# Patient Record
Sex: FEMALE | Race: BLACK OR AFRICAN AMERICAN | NOT HISPANIC OR LATINO | URBAN - METROPOLITAN AREA
[De-identification: names, ages, dates, MRNs, and addresses within clinical notes are randomized per-mention and may not be internally consistent; named-entity substitution may affect disease eponyms.]

---

## 2018-05-14 ENCOUNTER — OUTPATIENT (OUTPATIENT)
Dept: OUTPATIENT SERVICES | Facility: HOSPITAL | Age: 53
LOS: 1 days | End: 2018-05-14
Payer: COMMERCIAL

## 2018-05-14 VITALS
WEIGHT: 177.03 LBS | SYSTOLIC BLOOD PRESSURE: 108 MMHG | DIASTOLIC BLOOD PRESSURE: 69 MMHG | HEART RATE: 76 BPM | HEIGHT: 68 IN | OXYGEN SATURATION: 95 % | TEMPERATURE: 98 F | RESPIRATION RATE: 16 BRPM

## 2018-05-14 DIAGNOSIS — M17.12 UNILATERAL PRIMARY OSTEOARTHRITIS, LEFT KNEE: ICD-10-CM

## 2018-05-14 DIAGNOSIS — S83.242A OTHER TEAR OF MEDIAL MENISCUS, CURRENT INJURY, LEFT KNEE, INITIAL ENCOUNTER: ICD-10-CM

## 2018-05-14 DIAGNOSIS — Z01.818 ENCOUNTER FOR OTHER PREPROCEDURAL EXAMINATION: ICD-10-CM

## 2018-05-14 DIAGNOSIS — Q21.1 ATRIAL SEPTAL DEFECT: Chronic | ICD-10-CM

## 2018-05-14 DIAGNOSIS — M25.562 PAIN IN LEFT KNEE: ICD-10-CM

## 2018-05-14 DIAGNOSIS — Z98.51 TUBAL LIGATION STATUS: Chronic | ICD-10-CM

## 2018-05-14 LAB
ANION GAP SERPL CALC-SCNC: 7 MMOL/L — SIGNIFICANT CHANGE UP (ref 5–17)
BUN SERPL-MCNC: 13 MG/DL — SIGNIFICANT CHANGE UP (ref 7–23)
CALCIUM SERPL-MCNC: 8.6 MG/DL — SIGNIFICANT CHANGE UP (ref 8.4–10.5)
CHLORIDE SERPL-SCNC: 105 MMOL/L — SIGNIFICANT CHANGE UP (ref 96–108)
CO2 SERPL-SCNC: 28 MMOL/L — SIGNIFICANT CHANGE UP (ref 22–31)
CREAT SERPL-MCNC: 0.71 MG/DL — SIGNIFICANT CHANGE UP (ref 0.5–1.3)
GLUCOSE SERPL-MCNC: 84 MG/DL — SIGNIFICANT CHANGE UP (ref 70–99)
HCT VFR BLD CALC: 35.9 % — SIGNIFICANT CHANGE UP (ref 34.5–45)
HGB BLD-MCNC: 11.8 G/DL — SIGNIFICANT CHANGE UP (ref 11.5–15.5)
MCHC RBC-ENTMCNC: 29.7 PG — SIGNIFICANT CHANGE UP (ref 27–34)
MCHC RBC-ENTMCNC: 32.9 GM/DL — SIGNIFICANT CHANGE UP (ref 32–36)
MCV RBC AUTO: 90.1 FL — SIGNIFICANT CHANGE UP (ref 80–100)
PLATELET # BLD AUTO: 166 K/UL — SIGNIFICANT CHANGE UP (ref 150–400)
POTASSIUM SERPL-MCNC: 3.7 MMOL/L — SIGNIFICANT CHANGE UP (ref 3.5–5.3)
POTASSIUM SERPL-SCNC: 3.7 MMOL/L — SIGNIFICANT CHANGE UP (ref 3.5–5.3)
RBC # BLD: 3.98 M/UL — SIGNIFICANT CHANGE UP (ref 3.8–5.2)
RBC # FLD: 13 % — SIGNIFICANT CHANGE UP (ref 10.3–14.5)
SODIUM SERPL-SCNC: 140 MMOL/L — SIGNIFICANT CHANGE UP (ref 135–145)
WBC # BLD: 2.7 K/UL — LOW (ref 3.8–10.5)
WBC # FLD AUTO: 2.7 K/UL — LOW (ref 3.8–10.5)

## 2018-05-14 PROCEDURE — 80048 BASIC METABOLIC PNL TOTAL CA: CPT

## 2018-05-14 PROCEDURE — 85027 COMPLETE CBC AUTOMATED: CPT

## 2018-05-14 PROCEDURE — G0463: CPT

## 2018-05-14 PROCEDURE — 93005 ELECTROCARDIOGRAM TRACING: CPT

## 2018-05-14 PROCEDURE — 93010 ELECTROCARDIOGRAM REPORT: CPT | Mod: NC

## 2018-05-14 NOTE — H&P PST ADULT - PROBLEM SELECTOR PLAN 1
"Operative arthroscopy left knee" on 5/29/18  Pre op instructions were reviewed and signed  patient will obtain medical clearance  diagnostic testing was performed

## 2018-05-14 NOTE — H&P PST ADULT - NSANTHOSAYNRD_GEN_A_CORE
No. LOUISE screening performed.  STOP BANG Legend: 0-2 = LOW Risk; 3-4 = INTERMEDIATE Risk; 5-8 = HIGH Risk

## 2018-05-14 NOTE — H&P PST ADULT - HISTORY OF PRESENT ILLNESS
51 yo female is scheduled for "Operative arthroscopy left knee" on 5/29/18 with Igor Brown MD.  Patient with left knee pain since 1/2018 for which she has tried physical therapy.  Pain worst with weight bearing with swelling and rates 8/10.

## 2018-05-14 NOTE — H&P PST ADULT - NEGATIVE ENMT SYMPTOMS
no throat pain/no dysphagia/no nasal congestion/no nasal obstruction/no abnormal taste sensation/no ear pain/no hearing difficulty/no recurrent cold sores/no tinnitus/no gum bleeding/no vertigo/no sinus symptoms/no nose bleeds/no dry mouth/no nasal discharge/no post-nasal discharge

## 2018-05-17 RX ORDER — CHLORHEXIDINE GLUCONATE 213 G/1000ML
1 SOLUTION TOPICAL ONCE
Qty: 0 | Refills: 0 | Status: COMPLETED | OUTPATIENT
Start: 2018-05-29 | End: 2018-05-29

## 2018-05-17 NOTE — ASU DISCHARGE PLAN (ADULT/PEDIATRIC). - DRESSING FT
Remove bandages in 48 hours and apply band-aids to incision sites. Remove bandages in 48 hours, cleanse wounds with peroxide and apply band-aids over sutures.

## 2018-05-17 NOTE — ASU DISCHARGE PLAN (ADULT/PEDIATRIC). - ACTIVITY LEVEL
no tub baths/elevate extremity/no exercise/Use cane/crutches for ambulation. elevate extremity/weight bearing as tolerated/no tub baths/exercise/as per brochure

## 2018-05-17 NOTE — ASU DISCHARGE PLAN (ADULT/PEDIATRIC). - FOLLOWUP APPOINTMENT CLINIC/PHYSICIAN
Please call Dr. NIKKI Brown's office to schedule a follow-up appointment to be seen in 7-10 days. Please call Dr. NIKKI Brown's office to schedule a follow-up appointment to be seen in 7-10 days.  #

## 2018-05-17 NOTE — ASU DISCHARGE PLAN (ADULT/PEDIATRIC). - NOTIFY
Swelling that continues/Numbness, color, or temperature change to extremity/Bleeding that does not stop/Pain not relieved by Medications/Increased Irritability or Sluggishness/Fever greater than 101

## 2018-05-17 NOTE — ASU DISCHARGE PLAN (ADULT/PEDIATRIC). - MEDICATION SUMMARY - MEDICATIONS TO TAKE
I will START or STAY ON the medications listed below when I get home from the hospital:    Percocet 5/325 oral tablet  -- 1 tab(s) by mouth every 6 hours, As Needed -for moderate pain MDD:4   -- Caution federal law prohibits the transfer of this drug to any person other  than the person for whom it was prescribed.  May cause drowsiness.  Alcohol may intensify this effect.  Use care when operating dangerous machinery.  This prescription cannot be refilled.  This product contains acetaminophen.  Do not use  with any other product containing acetaminophen to prevent possible liver damage.  Using more of this medication than prescribed may cause serious breathing problems.    -- Indication: For moderate to severe left knee pain

## 2018-05-17 NOTE — ASU DISCHARGE PLAN (ADULT/PEDIATRIC). - SPECIAL INSTRUCTIONS
FOLLOW enclosed knee arthroscopy brochure.    May take over-the-counter pain relievers (Aleve (naprosyn), Motrin(ibuprofen) as directed for mild to moderate knee pain.

## 2018-05-17 NOTE — ASU DISCHARGE PLAN (ADULT/PEDIATRIC). - INSTRUCTIONS
REST! Apply ice packs to incision sites 20 mins on, 20 mins off every 4 hours for first 24 hours.  If taking narcotic pain medications, may take COLACE (OTC stool softener) as directed to prevent constipation. REST! Apply ice packs to incision sites 20 minutes on, 20 minutes off every 4 hours for first 24 hours.  If taking narcotic pain medications, may take COLACE (OTC stool softener) as directed to prevent constipation. Regular

## 2018-05-28 ENCOUNTER — TRANSCRIPTION ENCOUNTER (OUTPATIENT)
Age: 53
End: 2018-05-28

## 2018-05-29 ENCOUNTER — RESULT REVIEW (OUTPATIENT)
Age: 53
End: 2018-05-29

## 2018-05-29 ENCOUNTER — OUTPATIENT (OUTPATIENT)
Dept: OUTPATIENT SERVICES | Facility: HOSPITAL | Age: 53
LOS: 1 days | End: 2018-05-29
Payer: COMMERCIAL

## 2018-05-29 VITALS
DIASTOLIC BLOOD PRESSURE: 60 MMHG | SYSTOLIC BLOOD PRESSURE: 113 MMHG | RESPIRATION RATE: 16 BRPM | HEART RATE: 60 BPM | OXYGEN SATURATION: 100 %

## 2018-05-29 VITALS
SYSTOLIC BLOOD PRESSURE: 113 MMHG | TEMPERATURE: 98 F | OXYGEN SATURATION: 100 % | WEIGHT: 178.57 LBS | DIASTOLIC BLOOD PRESSURE: 64 MMHG | HEART RATE: 57 BPM | HEIGHT: 67.5 IN | RESPIRATION RATE: 8 BRPM

## 2018-05-29 DIAGNOSIS — M17.12 UNILATERAL PRIMARY OSTEOARTHRITIS, LEFT KNEE: ICD-10-CM

## 2018-05-29 DIAGNOSIS — Z01.818 ENCOUNTER FOR OTHER PREPROCEDURAL EXAMINATION: ICD-10-CM

## 2018-05-29 DIAGNOSIS — Z98.51 TUBAL LIGATION STATUS: Chronic | ICD-10-CM

## 2018-05-29 DIAGNOSIS — S83.242A OTHER TEAR OF MEDIAL MENISCUS, CURRENT INJURY, LEFT KNEE, INITIAL ENCOUNTER: ICD-10-CM

## 2018-05-29 DIAGNOSIS — Q21.1 ATRIAL SEPTAL DEFECT: Chronic | ICD-10-CM

## 2018-05-29 LAB — HCG UR QL: NEGATIVE — SIGNIFICANT CHANGE UP

## 2018-05-29 PROCEDURE — G8980: CPT | Mod: CI

## 2018-05-29 PROCEDURE — 88304 TISSUE EXAM BY PATHOLOGIST: CPT | Mod: 26

## 2018-05-29 PROCEDURE — G8978: CPT | Mod: CI

## 2018-05-29 PROCEDURE — 81025 URINE PREGNANCY TEST: CPT

## 2018-05-29 PROCEDURE — 97161 PT EVAL LOW COMPLEX 20 MIN: CPT | Mod: CI

## 2018-05-29 PROCEDURE — 29880 ARTHRS KNE SRG MNISECTMY M&L: CPT | Mod: LT

## 2018-05-29 PROCEDURE — G8979: CPT | Mod: CI

## 2018-05-29 PROCEDURE — 88304 TISSUE EXAM BY PATHOLOGIST: CPT

## 2018-05-29 RX ORDER — OXYCODONE AND ACETAMINOPHEN 5; 325 MG/1; MG/1
1 TABLET ORAL EVERY 4 HOURS
Qty: 0 | Refills: 0 | Status: DISCONTINUED | OUTPATIENT
Start: 2018-05-29 | End: 2018-05-29

## 2018-05-29 RX ORDER — SODIUM CHLORIDE 9 MG/ML
1000 INJECTION, SOLUTION INTRAVENOUS
Qty: 0 | Refills: 0 | Status: DISCONTINUED | OUTPATIENT
Start: 2018-05-29 | End: 2018-06-13

## 2018-05-29 RX ORDER — ONDANSETRON 8 MG/1
4 TABLET, FILM COATED ORAL ONCE
Qty: 0 | Refills: 0 | Status: DISCONTINUED | OUTPATIENT
Start: 2018-05-29 | End: 2018-06-13

## 2018-05-29 RX ORDER — FENTANYL CITRATE 50 UG/ML
25 INJECTION INTRAVENOUS
Qty: 0 | Refills: 0 | Status: DISCONTINUED | OUTPATIENT
Start: 2018-05-29 | End: 2018-05-29

## 2018-05-29 RX ORDER — CEFAZOLIN SODIUM 1 G
2000 VIAL (EA) INJECTION ONCE
Qty: 0 | Refills: 0 | Status: COMPLETED | OUTPATIENT
Start: 2018-05-29 | End: 2018-05-29

## 2018-05-29 RX ADMIN — SODIUM CHLORIDE 50 MILLILITER(S): 9 INJECTION, SOLUTION INTRAVENOUS at 09:03

## 2018-05-29 RX ADMIN — CHLORHEXIDINE GLUCONATE 1 APPLICATION(S): 213 SOLUTION TOPICAL at 06:55

## 2018-05-29 NOTE — BRIEF OPERATIVE NOTE - PROCEDURE
<<-----Click on this checkbox to enter Procedure Arthroscopy of knee  05/29/2018  Left knee  Partial lateral menisectomy  Excision Berkley Ayers

## 2018-05-30 LAB — SURGICAL PATHOLOGY FINAL REPORT - CH: SIGNIFICANT CHANGE UP

## 2018-07-19 ENCOUNTER — APPOINTMENT (OUTPATIENT)
Dept: ORTHOPEDIC SURGERY | Facility: CLINIC | Age: 53
End: 2018-07-19
Payer: COMMERCIAL

## 2018-07-19 VITALS
SYSTOLIC BLOOD PRESSURE: 122 MMHG | HEART RATE: 88 BPM | HEIGHT: 67 IN | DIASTOLIC BLOOD PRESSURE: 75 MMHG | WEIGHT: 178 LBS | BODY MASS INDEX: 27.94 KG/M2

## 2018-07-19 PROCEDURE — 99024 POSTOP FOLLOW-UP VISIT: CPT

## 2018-08-29 PROBLEM — M25.562 PAIN IN LEFT KNEE: Chronic | Status: ACTIVE | Noted: 2018-05-14

## 2018-09-13 ENCOUNTER — APPOINTMENT (OUTPATIENT)
Dept: ORTHOPEDIC SURGERY | Facility: CLINIC | Age: 53
End: 2018-09-13
Payer: COMMERCIAL

## 2018-09-13 VITALS — WEIGHT: 178 LBS | BODY MASS INDEX: 34.95 KG/M2 | HEIGHT: 60 IN

## 2018-09-13 DIAGNOSIS — M47.816 SPONDYLOSIS W/OUT MYELOPATHY OR RADICULOPATHY, LUMBAR REGION: ICD-10-CM

## 2018-09-13 PROCEDURE — 99214 OFFICE O/P EST MOD 30 MIN: CPT

## 2018-10-08 ENCOUNTER — APPOINTMENT (OUTPATIENT)
Dept: ORTHOPEDIC SURGERY | Facility: CLINIC | Age: 53
End: 2018-10-08

## 2018-10-10 ENCOUNTER — APPOINTMENT (OUTPATIENT)
Dept: ORTHOPEDIC SURGERY | Facility: CLINIC | Age: 53
End: 2018-10-10
Payer: COMMERCIAL

## 2018-10-10 VITALS — BODY MASS INDEX: 34.95 KG/M2 | HEIGHT: 60 IN | WEIGHT: 178 LBS

## 2018-10-10 DIAGNOSIS — M23.201 DERANGEMENT OF UNSPECIFIED LATERAL MENISCUS DUE TO OLD TEAR OR INJURY, LEFT KNEE: ICD-10-CM

## 2018-10-10 PROCEDURE — 99214 OFFICE O/P EST MOD 30 MIN: CPT

## 2019-01-21 ENCOUNTER — APPOINTMENT (OUTPATIENT)
Dept: ORTHOPEDIC SURGERY | Facility: CLINIC | Age: 54
End: 2019-01-21
Payer: COMMERCIAL

## 2019-01-21 VITALS — BODY MASS INDEX: 25.48 KG/M2 | WEIGHT: 178 LBS | HEIGHT: 70 IN

## 2019-01-21 DIAGNOSIS — M70.61 TROCHANTERIC BURSITIS, RIGHT HIP: ICD-10-CM

## 2019-01-21 DIAGNOSIS — M17.10 UNILATERAL PRIMARY OSTEOARTHRITIS, UNSPECIFIED KNEE: ICD-10-CM

## 2019-01-21 PROCEDURE — 99213 OFFICE O/P EST LOW 20 MIN: CPT

## 2020-10-02 ENCOUNTER — OUTPATIENT (OUTPATIENT)
Dept: OUTPATIENT SERVICES | Facility: HOSPITAL | Age: 55
LOS: 1 days | End: 2020-10-02
Payer: COMMERCIAL

## 2020-10-02 DIAGNOSIS — Q21.1 ATRIAL SEPTAL DEFECT: Chronic | ICD-10-CM

## 2020-10-02 DIAGNOSIS — Z98.51 TUBAL LIGATION STATUS: Chronic | ICD-10-CM

## 2020-10-02 DIAGNOSIS — Z11.59 ENCOUNTER FOR SCREENING FOR OTHER VIRAL DISEASES: ICD-10-CM

## 2020-10-02 LAB — SARS-COV-2 RNA SPEC QL NAA+PROBE: SIGNIFICANT CHANGE UP

## 2020-10-02 PROCEDURE — U0003: CPT

## 2020-11-11 ENCOUNTER — OUTPATIENT (OUTPATIENT)
Dept: OUTPATIENT SERVICES | Facility: HOSPITAL | Age: 55
LOS: 1 days | End: 2020-11-11
Payer: COMMERCIAL

## 2020-11-11 DIAGNOSIS — Z11.59 ENCOUNTER FOR SCREENING FOR OTHER VIRAL DISEASES: ICD-10-CM

## 2020-11-11 DIAGNOSIS — Z98.51 TUBAL LIGATION STATUS: Chronic | ICD-10-CM

## 2020-11-11 DIAGNOSIS — Q21.1 ATRIAL SEPTAL DEFECT: Chronic | ICD-10-CM

## 2020-11-11 LAB — SARS-COV-2 RNA SPEC QL NAA+PROBE: SIGNIFICANT CHANGE UP

## 2020-11-11 PROCEDURE — U0003: CPT

## 2022-04-05 NOTE — H&P PST ADULT - NSALCOHOLPROBLEMSRELYN_GEN_A_CORE_SD
Solaraze Counseling:  I discussed with the patient the risks of Solaraze including but not limited to erythema, scaling, itching, weeping, crusting, and pain. no

## 2022-06-25 NOTE — H&P PST ADULT - NSANTHAGERD_ENT_A_CORE
[facility] OFFICE VISIT    Patient Name: Agueda Narayanan  YOB: 1935  MRN: [id number]  Primary Care Physician: Andree Kraft NP  Date of Visit: 6/25/2022          Chief complaint:   Chief Complaint   Patient presents with   â¢ [facility]     HPI  Patient is a 82year old female presenting with daughter for low back pain. Patient states symptoms started approximately 1 month ago with low back pain radiating to the left buttocks and numbness/tingling radiating to the left thigh. Patient was seen by PCP for this 11 days ago where x-ray of lumbar spine and sacral iliac joints were completed which showed grade 1 spondylolisthesis at L4-5, T12-L5 mild spondylosis and normal SI joint. Patient was placed on a 5 day course of prednisone and referred to physical therapy. Patient noted mild improvement of pain with the steroid. Approximately 5 days ago, patient states that she was walking from her car to the pharmacy when she tripped and fell. Patient believes she landed on her left upper extremity but also believes she may have hit her left hip. Since then, patient reports worsening of her lower back pain as well as new onset radiation of pain down the right buttocks as well as numbness and tingling down the right leg. Patient contacted her PCP where she was placed on an additional 5 day course of prednisone with only minimal alleviation. Patient has an appointment with physical therapy in 2 days. She has been taking over-the-counter Tylenol and icing the region with minimal improvement. PAST MEDICAL, FAMILY AND SOCIAL HISTORY     Medications:  Current Outpatient Medications   Medication Sig Dispense Refill   â¢ acetaminophen-codeine (TYLENOL NO.2) 300-15 MG per tablet Take 1 tablet by mouth every 6 hours as needed for Pain. 10 tablet 0   â¢ Restasis 0.05 % ophthalmic emulsion Place 1 drop into both eyes 2 times daily.      â¢ timolol PF (TIMOPTIC OCUDOSE) 0.5 % ophthalmic solution      â¢ latanoprost (XALATAN) 0.005 % ophthalmic solution Place 1 drop into both eyes 2 times daily. 22.5 mL 11   â¢ dorzolamide (TRUSOPT) 2 % ophthalmic solution Place 1 drop into left eye 4 times daily. (Patient taking differently: Place 1 drop into left eye 4 times daily. 4/6/21 patient reports taking TID) 10 mL 4   â¢ Flax Oil-Fish Oil-Borage Oil (FISH OIL-FLAX OIL-BORAGE OIL) Cap Take 1 capsule by mouth daily. â¢ Multiple Vitamins-Minerals (OCUVITE PRESERVISION PO) Take 1 tablet by mouth daily. â¢ TURMERIC PO Take 1 capsule by mouth daily. â¢ DISPENSE LDM-100 (lomatium). Take 5 drops 3 times a day     â¢ Acetaminophen (TYLENOL PO) Take 500 mg to 650 mg by mouth once daily as needed for pain or fever. â¢ lactobacillus acidophilus (BACID) Take 1 tablet by mouth daily. Do not start before April 15, 2020. 20 tablet 0   â¢ Ascorbic Acid (VITAMIN C PO) Take 1 tablet by mouth daily. â¢ CALCIUM PO Take 1 tablet by mouth daily. â¢ CRANBERRY PO Take 1 tablet by mouth daily. â¢ Cholecalciferol (VITAMIN D-3 PO) Take 2,000 Units by mouth daily. â¢ COENZYME Q-10 PO Take 1 capsule by mouth daily. â¢ Multiple Vitamin (MULTIVITAMINS PO) Take 1 tablet by mouth 1 day a week. â¢ GLUCOSAMINE-CHONDROITIN PO Take 1 tablet by mouth as directed. Take 1 tablet of the tablet form Glucosamine-Chondroitin daily as a substitute for the liquid form if liquid form isn't taken that day. â¢ melatonin 3 MG TABS Take 3 mg by mouth nightly as needed. â¢ ECHINACEA PO Take 1 capsule by mouth daily. No current facility-administered medications for this visit.        Allergies:  ALLERGIES:   Allergen Reactions   â¢ Ciprofloxacin Other (See Comments)     Severe leg pain   â¢ Alphagan P VISUAL DISTURBANCE   â¢ Buspirone ANXIETY     Worsening anxiety   â¢ Celebrex [Celecoxib] SWELLING     Swelling of face, thick tongue, tingling  Throat swelling    â¢ Cephalexin SWELLING Swelling of face, thick tongue   â¢ Clindamycin Other (See Comments)     Caused C-dif   â¢ Erythromycin NAUSEA     Stomach upset    â¢ Methazolamide Other (See Comments)     Head felt hollow   â¢ Penicillins RASH   â¢ Sertraline Hcl DIARRHEA       Past Medical  History/Surgeries:  Past Medical History:   Diagnosis Date   â¢ Abnormal CT scan, pancreas or bile duct 10/7/2014   â¢ Anxiety     with some insomnia   â¢ Bipolar I disorder, most recent episode (or current) unspecified 3/21/2014   â¢ Chest pain 5/6/2012   â¢ COVID-19 virus infection 4/13/2020   â¢ COVID-19 virus infection 4/13/2020   â¢ Essential (primary) hypertension     with an element of white coat hypertension, has been intolerant of  atenolol and lisinipril   â¢ Family history of colon cancer 10/7/2014    Father and sister had colon cancer. â¢ Glaucoma    â¢ Manic behavior (CMS/HCC) 3/20/2014   â¢ Menopausal state 7/2/2013   â¢ Osteoarthritis    â¢ Paranoia (CMS/[facility]) 3/20/2014   â¢ Pneumonia    â¢ Urinary incontinence        Past Surgical History:   Procedure Laterality Date   â¢ Appendectomy      Pt reports incidental with one of her surgeries   â¢ Cholecystectomy  8/1/1986   â¢ Colonoscopy  4/30/15   â¢ Colonoscopy diagnostic  12/01/2009    No abnormal findings. Due to family hx recommend repeat 2014   â¢ Cystocele repair     â¢ Endoscopic retro cholangiopancreatography, w/rem foreign  01/13/15    Stent removal. Dr. Angelia Logan. â¢ Ercp,insert stent,biliary/panc  11/11/14    rpt 1/2015,CBD stent placement. Brushing>negative for malignant cells,Dr. HENRIQUEZ. â¢ Eye surgery     â¢ Hysterectomy  1981    Without BSO.   For fibroids   â¢ Laser surgery of eye      Due to glaucoma   â¢ Removal gallbladder     â¢ Repair of rectocele     â¢ Service to gastroenterology     â¢ Xray mammogram screening bilat  06/25/2012       Family History:  Family History   Problem Relation Age of Onset   â¢ Cancer Father         prostate and possible colon   â¢ Diabetes Mother    â¢ Hypertension "Mother    â¢ Cancer Sister         colon / ovarian   â¢ Scoliosis Daughter        Social History:  Social History     Tobacco Use   â¢ Smoking status: Never Smoker   â¢ Smokeless tobacco: Never Used   Substance Use Topics   â¢ Alcohol use: Never     Alcohol/week: 1.0 standard drink     Types: 1 Standard drinks or equivalent per week       REVIEW OF SYSTEMS     Review of Systems   All other review of systems negative, except as noted in the history of present illness. PHYSICAL EXAM     Vitals:  Visit Vitals  BP (!) 175/78 (BP Location: RUE - Right upper extremity, Patient Position: Sitting, Cuff Size: Regular)   Pulse 72   Temp 97.6 Â°F (36.4 Â°C) (Temporal)   Resp 12   Ht 5' 4"" (1.626 m)   Wt 56.1 kg (123 lb 9.6 oz)   SpO2 95%   BMI 21.22 kg/mÂ²       Physical Exam  Vitals and nursing note reviewed. Constitutional:       General: She is not in acute distress. Appearance: Normal appearance. She is not ill-appearing, toxic-appearing or diaphoretic. Eyes:      General:         Right eye: No discharge. Left eye: No discharge. Extraocular Movements: Extraocular movements intact. Conjunctiva/sclera: Conjunctivae normal.   Musculoskeletal:      Comments: Tenderness to palpation along bilateral paralumbar region and SI joints. Severely reduced AROM secondary to pain. No overlying erythema, bruising, open wounds. Skin:     General: Skin is warm and dry. Findings: No rash. Neurological:      Mental Status: She is alert and oriented to person, place, and time. Mental status is at baseline. Gait: Gait abnormal.   Psychiatric:         Mood and Affect: Mood normal.         Behavior: Behavior normal.         Thought Content:  Thought content normal.         URGENT CARE COURSE     Orders Placed This Encounter   â¢ XR LUMBAR SPINE 2 OR 3 VIEWS   â¢ XR Sacrum and Coccyx 2 + View   â¢ SERVICE TO SPINE   â¢ acetaminophen-codeine (TYLENOL NO.2) 300-15 MG per tablet " Yes Procedures:  none    ASSESSMENT/PLAN     1. Bilateral low back pain with bilateral sciatica, unspecified chronicity       Patient is a 82year old female presenting with back pain. Patient afebrile, non-toxic with reassuring vital signs. Physical examination reveals tenderness along the low back. Given recent fall and new onset right sided pain, will obtain new XRs for further investigation. Patient agreeable. Patient stable for discharge home with telephone follow-up on results and any subsequent therapeutic recommendations. In the interim, patient counseled on conservative management with rest, ice/heat and OTC analgesics as needed. RX for tylenol 2 sent for breakthrough pain; given older age, patient counseled on cautious use of medication and counseled on keeping track of acetaminophen intake from all sources. Recommend follow-up with PCP and physical therapy for continued management. Alarm symptoms, along with indications for return to urgent care, presentation to ED discussed. Patient verbalized understanding and agreement with plan. PATIENT INSTRUCTIONS:  Patient was advised to follow up with primary physician or to recheck with the urgent care clinic sooner if symptoms get worse or if new symptoms appear. The patient indicated understanding of the diagnosis and agreed with the plan of care.         Lor Colvin MD

## 2022-11-14 NOTE — H&P PST ADULT - BLOOD TRANSFUSION, PREVIOUS, PROFILE
no Sotyktu Pregnancy And Lactation Text: There is insufficient data to evaluate whether or not Sotyktu is safe to use during pregnancy.   It is not known if Sotyktu passes into breast milk and whether or not it is safe to use when breastfeeding.

## 2023-03-14 ENCOUNTER — OUTPATIENT (OUTPATIENT)
Dept: OUTPATIENT SERVICES | Facility: HOSPITAL | Age: 58
LOS: 1 days | Discharge: HOME | End: 2023-03-14
Payer: COMMERCIAL

## 2023-03-14 ENCOUNTER — APPOINTMENT (OUTPATIENT)
Dept: MRI IMAGING | Facility: HOSPITAL | Age: 58
End: 2023-03-14

## 2023-03-14 DIAGNOSIS — Q21.1 ATRIAL SEPTAL DEFECT: Chronic | ICD-10-CM

## 2023-03-14 DIAGNOSIS — Z98.51 TUBAL LIGATION STATUS: Chronic | ICD-10-CM

## 2023-03-14 DIAGNOSIS — R42 DIZZINESS AND GIDDINESS: ICD-10-CM

## 2023-03-14 PROCEDURE — 70553 MRI BRAIN STEM W/O & W/DYE: CPT | Mod: 26,76

## 2024-04-12 NOTE — PHYSICAL THERAPY INITIAL EVALUATION ADULT - LEVEL OF INDEPENDENCE: GAIT, REHAB EVAL
I have marked the patient's status as \"transition to different care\" and her recall has been deleted.   
independent
no